# Patient Record
Sex: FEMALE
[De-identification: names, ages, dates, MRNs, and addresses within clinical notes are randomized per-mention and may not be internally consistent; named-entity substitution may affect disease eponyms.]

---

## 2022-08-09 PROBLEM — Z00.00 ENCOUNTER FOR PREVENTIVE HEALTH EXAMINATION: Status: ACTIVE | Noted: 2022-08-09

## 2022-08-11 ENCOUNTER — APPOINTMENT (OUTPATIENT)
Dept: ORTHOPEDIC SURGERY | Facility: CLINIC | Age: 52
End: 2022-08-11

## 2025-05-21 ENCOUNTER — NON-APPOINTMENT (OUTPATIENT)
Age: 55
End: 2025-05-21

## 2025-05-23 ENCOUNTER — APPOINTMENT (OUTPATIENT)
Dept: CARDIOLOGY | Facility: CLINIC | Age: 55
End: 2025-05-23

## 2025-05-23 ENCOUNTER — NON-APPOINTMENT (OUTPATIENT)
Age: 55
End: 2025-05-23

## 2025-05-23 VITALS
SYSTOLIC BLOOD PRESSURE: 144 MMHG | DIASTOLIC BLOOD PRESSURE: 80 MMHG | HEART RATE: 71 BPM | WEIGHT: 197 LBS | OXYGEN SATURATION: 96 % | BODY MASS INDEX: 35.8 KG/M2 | HEIGHT: 62.3 IN

## 2025-05-23 VITALS — DIASTOLIC BLOOD PRESSURE: 78 MMHG | SYSTOLIC BLOOD PRESSURE: 136 MMHG

## 2025-05-23 DIAGNOSIS — Z87.898 PERSONAL HISTORY OF OTHER SPECIFIED CONDITIONS: ICD-10-CM

## 2025-05-23 DIAGNOSIS — Z86.39 PERSONAL HISTORY OF OTHER ENDOCRINE, NUTRITIONAL AND METABOLIC DISEASE: ICD-10-CM

## 2025-05-23 DIAGNOSIS — Z78.9 OTHER SPECIFIED HEALTH STATUS: ICD-10-CM

## 2025-05-23 DIAGNOSIS — I10 ESSENTIAL (PRIMARY) HYPERTENSION: ICD-10-CM

## 2025-05-23 DIAGNOSIS — M54.6 PAIN IN THORACIC SPINE: ICD-10-CM

## 2025-05-23 DIAGNOSIS — Z82.49 FAMILY HISTORY OF ISCHEMIC HEART DISEASE AND OTHER DISEASES OF THE CIRCULATORY SYSTEM: ICD-10-CM

## 2025-05-23 DIAGNOSIS — E03.9 HYPOTHYROIDISM, UNSPECIFIED: ICD-10-CM

## 2025-05-23 DIAGNOSIS — R06.02 SHORTNESS OF BREATH: ICD-10-CM

## 2025-05-23 DIAGNOSIS — Z87.891 PERSONAL HISTORY OF NICOTINE DEPENDENCE: ICD-10-CM

## 2025-05-23 DIAGNOSIS — E78.5 HYPERLIPIDEMIA, UNSPECIFIED: ICD-10-CM

## 2025-05-23 PROCEDURE — 93000 ELECTROCARDIOGRAM COMPLETE: CPT

## 2025-05-23 PROCEDURE — 99203 OFFICE O/P NEW LOW 30 MIN: CPT | Mod: 25

## 2025-05-23 RX ORDER — OLMESARTAN MEDOXOMIL AND HYDROCHLOROTHIAZIDE 40; 25 MG/1; MG/1
40-25 TABLET ORAL DAILY
Refills: 0 | Status: ACTIVE | COMMUNITY

## 2025-05-23 RX ORDER — LEVOTHYROXINE SODIUM 0.09 MG/1
88 TABLET ORAL DAILY
Refills: 0 | Status: ACTIVE | COMMUNITY

## 2025-05-23 RX ORDER — AMLODIPINE BESYLATE 5 MG/1
5 TABLET ORAL DAILY
Refills: 0 | Status: ACTIVE | COMMUNITY

## 2025-05-23 RX ORDER — ASPIRIN 81 MG
81 TABLET, DELAYED RELEASE (ENTERIC COATED) ORAL DAILY
Refills: 0 | Status: ACTIVE | COMMUNITY

## 2025-05-23 RX ORDER — METOPROLOL TARTRATE 50 MG/1
50 TABLET ORAL
Qty: 2 | Refills: 1 | Status: ACTIVE | COMMUNITY
Start: 2025-05-23 | End: 1900-01-01

## 2025-05-23 RX ORDER — ATORVASTATIN CALCIUM 20 MG/1
20 TABLET, FILM COATED ORAL DAILY
Refills: 0 | Status: ACTIVE | COMMUNITY

## 2025-05-23 RX ORDER — METOPROLOL SUCCINATE 25 MG/1
25 TABLET, EXTENDED RELEASE ORAL DAILY
Refills: 0 | Status: ACTIVE | COMMUNITY

## 2025-06-10 ENCOUNTER — APPOINTMENT (OUTPATIENT)
Dept: CT IMAGING | Facility: CLINIC | Age: 55
End: 2025-06-10
Payer: COMMERCIAL

## 2025-06-10 PROCEDURE — 75574 CT ANGIO HRT W/3D IMAGE: CPT

## 2025-06-12 PROCEDURE — 75580 N-INVAS EST C FFR SW ALY CTA: CPT

## 2025-06-19 ENCOUNTER — TRANSCRIPTION ENCOUNTER (OUTPATIENT)
Age: 55
End: 2025-06-19

## 2025-06-19 ENCOUNTER — OUTPATIENT (OUTPATIENT)
Dept: OUTPATIENT SERVICES | Facility: HOSPITAL | Age: 55
LOS: 1 days | End: 2025-06-19
Payer: COMMERCIAL

## 2025-06-19 VITALS
SYSTOLIC BLOOD PRESSURE: 132 MMHG | RESPIRATION RATE: 15 BRPM | HEART RATE: 78 BPM | OXYGEN SATURATION: 98 % | DIASTOLIC BLOOD PRESSURE: 76 MMHG

## 2025-06-19 VITALS
SYSTOLIC BLOOD PRESSURE: 112 MMHG | OXYGEN SATURATION: 98 % | RESPIRATION RATE: 16 BRPM | DIASTOLIC BLOOD PRESSURE: 61 MMHG | HEART RATE: 64 BPM | TEMPERATURE: 98 F

## 2025-06-19 DIAGNOSIS — R06.02 SHORTNESS OF BREATH: ICD-10-CM

## 2025-06-19 DIAGNOSIS — I25.10 ATHEROSCLEROTIC HEART DISEASE OF NATIVE CORONARY ARTERY WITHOUT ANGINA PECTORIS: ICD-10-CM

## 2025-06-19 LAB
ALBUMIN SERPL ELPH-MCNC: 4.3 G/DL — SIGNIFICANT CHANGE UP (ref 3.3–5.2)
ALP SERPL-CCNC: 63 U/L — SIGNIFICANT CHANGE UP (ref 40–120)
ALT FLD-CCNC: 19 U/L — SIGNIFICANT CHANGE UP
ANION GAP SERPL CALC-SCNC: 13 MMOL/L — SIGNIFICANT CHANGE UP (ref 5–17)
AST SERPL-CCNC: 18 U/L — SIGNIFICANT CHANGE UP
BILIRUB SERPL-MCNC: 0.7 MG/DL — SIGNIFICANT CHANGE UP (ref 0.4–2)
BUN SERPL-MCNC: 15.7 MG/DL — SIGNIFICANT CHANGE UP (ref 8–20)
CALCIUM SERPL-MCNC: 9.3 MG/DL — SIGNIFICANT CHANGE UP (ref 8.4–10.5)
CHLORIDE SERPL-SCNC: 99 MMOL/L — SIGNIFICANT CHANGE UP (ref 96–108)
CO2 SERPL-SCNC: 27 MMOL/L — SIGNIFICANT CHANGE UP (ref 22–29)
CREAT SERPL-MCNC: 0.72 MG/DL — SIGNIFICANT CHANGE UP (ref 0.5–1.3)
EGFR: 101 ML/MIN/1.73M2 — SIGNIFICANT CHANGE UP
EGFR: 101 ML/MIN/1.73M2 — SIGNIFICANT CHANGE UP
GLUCOSE SERPL-MCNC: 94 MG/DL — SIGNIFICANT CHANGE UP (ref 70–99)
HCT VFR BLD CALC: 33.6 % — LOW (ref 34.5–45)
HGB BLD-MCNC: 11.5 G/DL — SIGNIFICANT CHANGE UP (ref 11.5–15.5)
MAGNESIUM SERPL-MCNC: 2.3 MG/DL — SIGNIFICANT CHANGE UP (ref 1.6–2.6)
MCHC RBC-ENTMCNC: 33.2 PG — SIGNIFICANT CHANGE UP (ref 27–34)
MCHC RBC-ENTMCNC: 34.2 G/DL — SIGNIFICANT CHANGE UP (ref 32–36)
MCV RBC AUTO: 97.1 FL — SIGNIFICANT CHANGE UP (ref 80–100)
NRBC # BLD AUTO: 0 K/UL — SIGNIFICANT CHANGE UP (ref 0–0)
NRBC # FLD: 0 K/UL — SIGNIFICANT CHANGE UP (ref 0–0)
NRBC BLD AUTO-RTO: 0 /100 WBCS — SIGNIFICANT CHANGE UP (ref 0–0)
PLATELET # BLD AUTO: 294 K/UL — SIGNIFICANT CHANGE UP (ref 150–400)
PMV BLD: 8.8 FL — SIGNIFICANT CHANGE UP (ref 7–13)
POTASSIUM SERPL-MCNC: 4 MMOL/L — SIGNIFICANT CHANGE UP (ref 3.5–5.3)
POTASSIUM SERPL-SCNC: 4 MMOL/L — SIGNIFICANT CHANGE UP (ref 3.5–5.3)
PROT SERPL-MCNC: 6.6 G/DL — SIGNIFICANT CHANGE UP (ref 6.6–8.7)
RBC # BLD: 3.46 M/UL — LOW (ref 3.8–5.2)
RBC # FLD: 12.2 % — SIGNIFICANT CHANGE UP (ref 10.3–14.5)
SODIUM SERPL-SCNC: 139 MMOL/L — SIGNIFICANT CHANGE UP (ref 135–145)
WBC # BLD: 4.52 K/UL — SIGNIFICANT CHANGE UP (ref 3.8–10.5)
WBC # FLD AUTO: 4.52 K/UL — SIGNIFICANT CHANGE UP (ref 3.8–10.5)

## 2025-06-19 PROCEDURE — 80053 COMPREHEN METABOLIC PANEL: CPT

## 2025-06-19 PROCEDURE — 93458 L HRT ARTERY/VENTRICLE ANGIO: CPT

## 2025-06-19 PROCEDURE — 93005 ELECTROCARDIOGRAM TRACING: CPT

## 2025-06-19 PROCEDURE — 93458 L HRT ARTERY/VENTRICLE ANGIO: CPT | Mod: 26

## 2025-06-19 PROCEDURE — C1769: CPT

## 2025-06-19 PROCEDURE — 83735 ASSAY OF MAGNESIUM: CPT

## 2025-06-19 PROCEDURE — 85027 COMPLETE CBC AUTOMATED: CPT

## 2025-06-19 PROCEDURE — C1894: CPT

## 2025-06-19 PROCEDURE — 99232 SBSQ HOSP IP/OBS MODERATE 35: CPT | Mod: 25

## 2025-06-19 PROCEDURE — 36415 COLL VENOUS BLD VENIPUNCTURE: CPT

## 2025-06-19 PROCEDURE — 93010 ELECTROCARDIOGRAM REPORT: CPT

## 2025-06-19 PROCEDURE — 99152 MOD SED SAME PHYS/QHP 5/>YRS: CPT

## 2025-06-19 PROCEDURE — C1887: CPT

## 2025-06-19 RX ORDER — ATORVASTATIN CALCIUM 80 MG/1
1 TABLET, FILM COATED ORAL
Refills: 0 | DISCHARGE

## 2025-06-19 RX ORDER — LEVOTHYROXINE SODIUM 300 MCG
1 TABLET ORAL
Refills: 0 | DISCHARGE

## 2025-06-19 RX ORDER — METOPROLOL SUCCINATE 50 MG/1
1 TABLET, EXTENDED RELEASE ORAL
Refills: 0 | DISCHARGE

## 2025-06-19 RX ORDER — ASPIRIN 325 MG
81 TABLET ORAL DAILY
Refills: 0 | Status: ACTIVE | OUTPATIENT
Start: 2025-06-19 | End: 2026-05-18

## 2025-06-19 RX ORDER — ASPIRIN 325 MG
1 TABLET ORAL
Refills: 0 | DISCHARGE

## 2025-06-19 RX ORDER — OLMESARTAN MEDOXOMIL AND HYDROCHLOROTHIAZIDE 20; 12.5 MG/1; MG/1
1 TABLET ORAL
Refills: 0 | DISCHARGE

## 2025-06-19 RX ORDER — AMLODIPINE BESYLATE 10 MG/1
1 TABLET ORAL
Refills: 0 | DISCHARGE

## 2025-06-19 RX ADMIN — Medication 250 MILLILITER(S): at 11:53

## 2025-06-19 RX ADMIN — Medication 81 MILLIGRAM(S): at 11:53

## 2025-06-19 NOTE — DISCHARGE NOTE PROVIDER - NSDCMRMEDTOKEN_GEN_ALL_CORE_FT
amLODIPine 5 mg oral tablet: 1 tab(s) orally once a day (at bedtime)  aspirin 81 mg oral capsule: 1 cap(s) orally once a day (at bedtime)  atorvastatin 20 mg oral tablet: 1 tab(s) orally once a day  Levothroid 88 mcg (0.088 mg) oral tablet: 1 tab(s) orally once a day  metoprolol succinate 25 mg oral tablet, extended release: 1 tab(s) orally once a day (at bedtime)  olmesartan-hydrochlorothiazide 40 mg-25 mg oral tablet: 1 tab(s) orally once a day

## 2025-06-19 NOTE — DISCHARGE NOTE PROVIDER - CARE PROVIDER_API CALL
Josue Bhagat  Internal Medicine  [address] [facility], [address] [address]  Phone: [phone]  Fax: [phone]  Follow Up Time: 2 weeks

## 2025-06-19 NOTE — H&P PST ADULT - HISTORY OF PRESENT ILLNESS
Narrative:     Symptoms:        Angina (Class):        Ischemic Symptoms:     Heart Failure:        Systolic/Diastolic/Combined:        NYHA Class (within 2 weeks):     Assessment of LVEF (Must be within 6 months):       EF:        Assessed by:        Date:     Prior Cardiac Interventions (LHC, stents, CABG):       PCI's (Date, Stents, Vessels):        CABG (Date, Grafts):       Stress Test (Date, Findings):     Echo (Date, Findings):     Antianginal Therapies:        Beta Blockers:         Calcium Channel Blockers:        Long Acting Nitrates:        Ranexa:     Associated Risk Factors:        Frailty Score: (N/A, mild, moderate, severe)       Cerebrovascular Disease: N/A       Chronic Lung Disease: N/A       Peripheral Arterial Disease: N/A       Chronic Kidney Disease (if yes, what is GFR): N/A       Uncontrolled Diabetes (if yes, what is HgbA1C or FBS): N/A       Poorly Controlled Hypertension (if yes, what is SBP): N/A       Morbid Obesity (if yes, what is BMI): N/A       History of Recent Ventricular Arrhythmia: N/A       Inability to Ambulate Safely: N/A       Need for Therapeutic Anticoagulation: N/A       Antiplatelet or Contrast Allergy: N/A    VITAL SIGNS:       LABS:   Narrative: 54 year old woman with PMHx of HLD, HTN, hypothyroidism and  family history of CAD with her mom having CABG age 74 and AAA. She has gained weight since the pandemic. She is active and has no chest pain but she notes significant exertional dyspnea with climbing stairs. She denies orthopnea or PND. She denies palpitations or near syncope. First  week of May, she had an episode of acute back pain that was transient and has not recurred. Pt got CCTA CALCIUM SCORE:  is 899 , The calcium score for each vessel is as follows:  Agatston Score:Left main: 70. Left anterior descendin. Left circumflex: 116. Right coronary: 292. Left main: 54. Left anterior descending: 320. Left circumflex: 93. Right coronary: 243. Pt presents today for [facility] with Dr Shaffer    Symptoms:        Angina (Class):        Ischemic Symptoms:     Heart Failure: NA       Systolic/Diastolic/Combined:        NYHA Class (within 2 weeks):     Assessment of LVEF (Must be within 6 months):       EF: 67%       Assessed by: ECHO       Date: 2024    Prior Cardiac Interventions (LHC, stents, CABG): NA       PCI's (Date, Stents, Vessels):        CABG (Date, Grafts):     Stress Test (Date, Findings): NA    Echo (Date, Findings):   Echo: 2024: LVEF 67%, trace TR      CTA heart w/coronaries  FINDINGS:  NATIVE CORONARY ARTERIES:  : There is no evidence for anomalous coronary artery origin or course. There is co-coronary artery dominance.  *  LEFT MAIN: There are calcified noncalcified atheromas in left main ostium with minimal to mild luminal narrowing. There is additional calcified atheroma in distal left main, extending to LAD ostium with mild luminal narrowing. The left main bifurcates into LAD and LCx.  *  LEFT ANTERIOR DESCENDING: There are three  diagonal branches. Distal vessel wraps around apex.  Proximal: Moderate luminal narrowing due to a long segment calcified atheroma.  Mid: Minimal luminal narrowing due to a long segment calcified atheroma.  Distal: Mild to moderate luminal narrowing due to calcified atheroma. There is myocardial bridge in distal LAD, with no significant luminal narrowing  Diagonal branches: Diagonal branches are diminutive and not well opacified. .  *  LEFT CIRCUMFLEX: There are two obtuse marginal branches.  Proximal: Mild luminal narrowing due to calcified plaque.  Mid/Distal: Normal .  First obtuse marginal: Moderate to severe luminal narrowing due to calcified atheromas.  Second obtuse marginal: Normal .  *  RIGHT CORONARY ARTERY:  Proximal: Mild luminal narrowing due to calcified and noncalcified atheromas.  Mid: Mild luminal narrowing due to calcified and noncalcified atheromas.  Distal: Minimal luminal narrowing due to calcified and noncalcified atheromas.  The posterior descending artery (PDA) and the posterior left ventricular (PLV) branches originate from the RCA.  Posterior descending: Normal .  Posterior lateral: Normal .  CALCIUM SCORE: The observed Agatston Calcium Score is 899 , which is at 99% for subjects of the same age, gender, and race/ethnicity who are free of clinical cardiovascular disease and treated diabetes. The calcium score for each vessel is as follows:  Agatston Score:  Left main: 70.  Left anterior descendin.  Left circumflex: 116.  Right coronary: 292.    Calcium volume (cubic millimeters):  Left main: 54.  Left anterior descending: 320.  Left circumflex: 93.  Right coronary: 243.    Antianginal Therapies:        Beta Blockers:  Metoprolol        Calcium Channel Blockers: Amlodipine       Long Acting Nitrates:        Ranexa:     Associated Risk Factors:        Frailty Score: (N/A, mild, moderate, severe)       Cerebrovascular Disease: N/A       Chronic Lung Disease: N/A       Peripheral Arterial Disease: N/A       Chronic Kidney Disease (if yes, what is GFR): N/A       Uncontrolled Diabetes (if yes, what is HgbA1C or FBS): N/A       Poorly Controlled Hypertension (if yes, what is SBP): N/A       Morbid Obesity (if yes, what is BMI): N/A       History of Recent Ventricular Arrhythmia: N/A       Inability to Ambulate Safely: N/A       Need for Therapeutic Anticoagulation: N/A       Antiplatelet or Contrast Allergy: N/A    VITAL SIGNS:       LABS:   Narrative: 54 year old woman with PMHx of HLD, HTN, hypothyroidism and  family history of CAD with her mom having CABG age 74 and AAA. She has gained weight since the pandemic. She is active and has no chest pain but she notes significant exertional dyspnea with climbing stairs. She denies orthopnea or PND. She denies palpitations or near syncope. First  week of May, she had an episode of acute back pain that was transient and has not recurred. Pt got CCTA CALCIUM SCORE:  is 899 , The calcium score for each vessel is as follows:  Agatston Score:Left main: 70. Left anterior descendin. Left circumflex: 116. Right coronary: 292. Left main: 54. Left anterior descending: 320. Left circumflex: 93. Right coronary: 243. Pt presents today for [facility] with Dr Shaffer    Symptoms:        Angina (Class): ll       Ischemic Symptoms: DAVIS    Heart Failure: NA       Systolic/Diastolic/Combined:        NYHA Class (within 2 weeks):     Assessment of LVEF (Must be within 6 months):       EF: 67%       Assessed by: ECHO       Date: 2024    Prior Cardiac Interventions (LHC, stents, CABG): NA       PCI's (Date, Stents, Vessels):        CABG (Date, Grafts):     Stress Test (Date, Findings): NA    Echo (Date, Findings):   Echo: 2024: LVEF 67%, trace TR      CTA heart w/coronaries  FINDINGS:  NATIVE CORONARY ARTERIES:  : There is no evidence for anomalous coronary artery origin or course. There is co-coronary artery dominance.  *  LEFT MAIN: There are calcified noncalcified atheromas in left main ostium with minimal to mild luminal narrowing. There is additional calcified atheroma in distal left main, extending to LAD ostium with mild luminal narrowing. The left main bifurcates into LAD and LCx.  *  LEFT ANTERIOR DESCENDING: There are three  diagonal branches. Distal vessel wraps around apex.  Proximal: Moderate luminal narrowing due to a long segment calcified atheroma.  Mid: Minimal luminal narrowing due to a long segment calcified atheroma.  Distal: Mild to moderate luminal narrowing due to calcified atheroma. There is myocardial bridge in distal LAD, with no significant luminal narrowing  Diagonal branches: Diagonal branches are diminutive and not well opacified. .  *  LEFT CIRCUMFLEX: There are two obtuse marginal branches.  Proximal: Mild luminal narrowing due to calcified plaque.  Mid/Distal: Normal .  First obtuse marginal: Moderate to severe luminal narrowing due to calcified atheromas.  Second obtuse marginal: Normal .  *  RIGHT CORONARY ARTERY:  Proximal: Mild luminal narrowing due to calcified and noncalcified atheromas.  Mid: Mild luminal narrowing due to calcified and noncalcified atheromas.  Distal: Minimal luminal narrowing due to calcified and noncalcified atheromas.  The posterior descending artery (PDA) and the posterior left ventricular (PLV) branches originate from the RCA.  Posterior descending: Normal .  Posterior lateral: Normal .  CALCIUM SCORE: The observed Agatston Calcium Score is 899 , which is at 99% for subjects of the same age, gender, and race/ethnicity who are free of clinical cardiovascular disease and treated diabetes. The calcium score for each vessel is as follows:  Agatston Score:  Left main: 70.  Left anterior descendin.  Left circumflex: 116.  Right coronary: 292.    Calcium volume (cubic millimeters):  Left main: 54.  Left anterior descending: 320.  Left circumflex: 93.  Right coronary: 243.    Antianginal Therapies:        Beta Blockers:  Metoprolol        Calcium Channel Blockers: Amlodipine       Long Acting Nitrates:        Ranexa:     Associated Risk Factors:        Frailty Score: (N/A, mild, moderate, severe) na       Cerebrovascular Disease: N/A       Chronic Lung Disease: N/A       Peripheral Arterial Disease: N/A       Chronic Kidney Disease (if yes, what is GFR): N/A       Uncontrolled Diabetes (if yes, what is HgbA1C or FBS): N/A       Poorly Controlled Hypertension (if yes, what is SBP): N/A       Morbid Obesity (if yes, what is BMI): 35.7       History of Recent Ventricular Arrhythmia: N/A       Inability to Ambulate Safely: N/A       Need for Therapeutic Anticoagulation: N/A       Antiplatelet or Contrast Allergy: N/A    VITAL SIGNS:   Vital Signs Last 24 Hrs  T(C): 36.5 (2025 11:19), Max: 36.5 (2025 11:19)  T(F): 97.7 (2025 11:19), Max: 97.7 (2025 11:19)  HR: 64 (2025 11:19) (64 - 64)  BP: 112/61 (2025 11:19) (112/61 - 112/61)  BP(mean): --  RR: 16 (2025 11:19) (16 - 16)  SpO2: 98% (2025 11:19) (98% - 98%)    Parameters below as of 2025 11:19  Patient On (Oxygen Delivery Method): room air    LABS:                        11.5   4.52  )-----------( 294      ( 2025 11:00 )             33.6     06-19    139  |  99  |  15.7  ----------------------------<  94  4.0   |  27.0  |  0.72    Ca    9.3      2025 11:00  Mg     2.3     06-19    TPro  6.6  /  Alb  4.3  /  TBili  0.7  /  DBili  x   /  AST  18  /  ALT  19  /  AlkPhos  63  06-

## 2025-06-19 NOTE — DISCHARGE NOTE PROVIDER - HOSPITAL COURSE
54 year old woman with PMHx of HLD, HTN, hypothyroidism and  family history of CAD with her mom having CABG age 74 and AAA. She has gained weight since the pandemic. She is active and has no chest pain but she notes significant exertional dyspnea with climbing stairs. She denies orthopnea or PND. She denies palpitations or near syncope. First  week of May, she had an episode of acute back pain that was transient and has not recurred. Pt got CCTA CALCIUM SCORE:  is 899 , The calcium score for each vessel is as follows:  Agatston Score:Left main: 70. Left anterior descendin. Left circumflex: 116. Right coronary: 292. Left main: 54. Left anterior descending: 320. Left circumflex: 93. Right coronary: 243. Pt presents today for LHC with Dr Shaffer    s/p Cath: pt is now s/p LHC/RHC via  RRA with Dr. Shaffer    Intraprocedural findings  pLAD 20%. Nonobstructive cad  Stents: none    Plan:  -Formal cath report pending  -Radial precautions discussed with patient  -Continue current medical therapy  Home Medications:  amLODIPine 5 mg oral tablet: 1 tab(s) orally once a day (at bedtime) (2025 11:18)  aspirin 81 mg oral capsule: 1 cap(s) orally once a day (at bedtime) (2025 11:12)  atorvastatin 20 mg oral tablet: 1 tab(s) orally once a day (2025 11:14)  metoprolol succinate 25 mg oral tablet, extended release: 1 tab(s) orally once a day (at bedtime) (2025 11:17)  olmesartan-hydrochlorothiazide 40 mg-25 mg oral tablet: 1 tab(s) orally once a day (2025 11:16)  -Educated regarding post procedure management and care  -Discussed the importance of RF modification  -F/U outpt in 1-2 weeks with Cardiologist Dr. Bhagat  - Plan for D/C patient this afternoon if remains HDS  and without complications    Case discussed with Dr Shaffer.

## 2025-06-19 NOTE — DISCHARGE NOTE NURSING/CASE MANAGEMENT/SOCIAL WORK - PATIENT PORTAL LINK FT
You can access the FollowMyHealth Patient Portal offered by [facility] by registering at the following website: [email or web address]. By joining [facility]’s FollowMyHealth portal, you will also be able to view your health information using other applications (apps) compatible with our system.

## 2025-06-19 NOTE — DISCHARGE NOTE NURSING/CASE MANAGEMENT/SOCIAL WORK - FINANCIAL ASSISTANCE
[facility] provides services at a reduced cost to those who are determined to be eligible through [facility]’s financial assistance program. Information regarding [facility]’s financial assistance program can be found by going to [email or web address] or by calling [phone].

## 2025-06-19 NOTE — H&P PST ADULT - ASSESSMENT
Risk Stratification:  ASA:   Mallampati:   Bleeding Risk:   Creatinine:   GFR:   Pt assessed, appropriate for sedation, pt educated regarding the plan for Versed/Fentanyl as needed.    Plan/Recommendations:   -plan for ***  -preferred access: RRA ***  -patient seen and examined  -confirmed appropriate NPO duration  -ECG and Labs reviewed  -Aspirin 81mg po pre-cath ***  -NS 250mL IV bolus pre-cath ***  -procedure discussed with patient; risks and benefits explained, questions answered  -consent obtained by attending IC    Risks, benefits, and alternatives reviewed.  Risks including but not limited to MI, death, stroke, bleeding, infection, vessel injury, hematoma, renal failure, allergic reaction, urgent open heart surgery, restenosis and stent thrombosis were reviewed.  All questions answered.  Patient is agreeable to proceed. 54 year old woman with PMHx of HLD, HTN, hypothyroidism and  family history of CAD with her mom having CABG age 74 and AAA. She has gained weight since the pandemic. She is active and has no chest pain but she notes significant exertional dyspnea with climbing stairs. She denies orthopnea or PND. She denies palpitations or near syncope. First  week of May, she had an episode of acute back pain that was transient and has not recurred. Pt got CCTA CALCIUM SCORE:  is 899 , The calcium score for each vessel is as follows:  Agatston Score:Left main: 70. Left anterior descendin. Left circumflex: 116. Right coronary: 292. Left main: 54. Left anterior descending: 320. Left circumflex: 93. Right coronary: 243. Pt presents today for C with Dr Shaffer    Risk Stratification:  ASA: 3  Mallampati: 2  Bleeding Risk:     Pt assessed, appropriate for sedation, pt educated regarding the plan for Versed/Fentanyl as needed.    Plan/Recommendations:   -plan for LHC  -preferred access: RRA vs RFA  -patient seen and examined  -confirmed appropriate NPO duration  -ECG and Labs reviewed  -Aspirin 81mg po pre-cath   -NS 250mL IV bolus pre-cath   -procedure discussed with patient; risks and benefits explained, questions answered  -consent obtained by attending IC    Risks, benefits, and alternatives reviewed.  Risks including but not limited to MI, death, stroke, bleeding, infection, vessel injury, hematoma, renal failure, allergic reaction, urgent open heart surgery, restenosis and stent thrombosis were reviewed.  All questions answered.  Patient is agreeable to proceed. 54 year old woman with PMHx of HLD, HTN, hypothyroidism and  family history of CAD with her mom having CABG age 74 and AAA. She has gained weight since the pandemic. She is active and has no chest pain but she notes significant exertional dyspnea with climbing stairs. She denies orthopnea or PND. She denies palpitations or near syncope. First  week of May, she had an episode of acute back pain that was transient and has not recurred. Pt got CCTA CALCIUM SCORE:  is 899 , The calcium score for each vessel is as follows:  Agatston Score:Left main: 70. Left anterior descendin. Left circumflex: 116. Right coronary: 292. Left main: 54. Left anterior descending: 320. Left circumflex: 93. Right coronary: 243. Pt presents today for LHC with Dr Shaffer    Risk Stratification:  ASA: 3  Mallampati: 2  Bleeding Risk: 1.6    Pt assessed, appropriate for sedation, pt educated regarding the plan for Versed/Fentanyl as needed.    Plan/Recommendations:   -plan for LHC  -preferred access: RRA vs RFA  -patient seen and examined  -confirmed appropriate NPO duration  -ECG and Labs reviewed  -Aspirin 81mg po pre-cath   -NS 250mL IV bolus pre-cath   -procedure discussed with patient; risks and benefits explained, questions answered  -consent obtained by attending IC    Risks, benefits, and alternatives reviewed.  Risks including but not limited to MI, death, stroke, bleeding, infection, vessel injury, hematoma, renal failure, allergic reaction, urgent open heart surgery, restenosis and stent thrombosis were reviewed.  All questions answered.  Patient is agreeable to proceed.

## 2025-06-19 NOTE — CHART NOTE - NSCHARTNOTEFT_GEN_A_CORE
Now s/p LHC via  RRA with Dr. Shaffer. Pt tolerated procedure well. Pt arrived to recovery in NAD and HDS.  Access site stable, no bleed/hematoma, distal pulse +.  Denies complaints of chest pain, SOB, dizziness, or palpitations    Intraprocedural findings  pLAD 20%. Nonobstructive cad  Stents: none    Medications  Versed: 0.5 mg  Fentanyl: 25 mcg  Heparin: 4000 unit  Plavix/Brillinta: none  Omnipaque: 39 ml    Closure Device: RRA band    ALLERGIES:   morphine (Rash)  Sulfur Colliod (Rash)    PHYSICAL EXAM:  Constitutional: Comfortable . No acute distress.   CNS: A&O for 3. No focal deficits.   Respiratory: CTAB, unlabored   Cardiovascular: RRR normal s1 s2. No murmur. No gallop.  Gastrointestinal: Soft, non-tender. +Bowel sounds.   Extremities: 2+ Peripheral Pulses, No  edema  Wrist: R + Benign, no hematoma, no bleeding.   Psychiatric: Calm . no agitation.   Skin: Warm and dry.    VITAL SIGNS:   T(C): 36.5 (06-19-25 @ 11:19), Max: 36.5 (06-19-25 @ 11:19)  T(F): 97.7 (06-19-25 @ 11:19), Max: 97.7 (06-19-25 @ 11:19)  HR: 77 (06-19-25 @ 14:15) (64 - 78)  BP: 119/77 (06-19-25 @ 13:40) (112/61 - 121/74)  RR: 15 (06-19-25 @ 14:15) (15 - 17)  SpO2: 98% (06-19-25 @ 14:15) (97% - 98%)    s/p Cath: pt is now s/p LHC/RHC via  RRA with Dr. Shaffer    Intraprocedural findings  pLAD 20%. Nonobstructive cad  Stents: none      Plan:  -Formal cath report pending  -Post procedure management/monitoring per protocol  -Radial compression band removal at 14.45  -Bedrest for 2 hours post procedure  -Radial precautions discussed with patient  -NS 0.9% 250ml/hr for 1 bolus: post procedure [place]IN ppx   -Repeat ECG if any clinical indication or change on tele  -Continue current medical therapy  Home Medications:  amLODIPine 5 mg oral tablet: 1 tab(s) orally once a day (at bedtime) (19 Jun 2025 11:18)  aspirin 81 mg oral capsule: 1 cap(s) orally once a day (at bedtime) (19 Jun 2025 11:12)  atorvastatin 20 mg oral tablet: 1 tab(s) orally once a day (19 Jun 2025 11:14)  metoprolol succinate 25 mg oral tablet, extended release: 1 tab(s) orally once a day (at bedtime) (19 Jun 2025 11:17)  olmesartan-hydrochlorothiazide 40 mg-25 mg oral tablet: 1 tab(s) orally once a day (19 Jun 2025 11:16)  -Educated regarding post procedure management and care  -Discussed the importance of RF modification  -F/U outpt in 1-2 weeks with Cardiologist Dr. Bhagat  - Plan for D/C patient this afternoon if remains HDS  and without complications    Case discussed with Dr Shaffer

## 2025-06-24 ENCOUNTER — NON-APPOINTMENT (OUTPATIENT)
Age: 55
End: 2025-06-24

## 2025-06-24 PROBLEM — E78.5 HYPERLIPIDEMIA, UNSPECIFIED: Chronic | Status: ACTIVE | Noted: 2025-06-19

## 2025-06-24 PROBLEM — I10 ESSENTIAL (PRIMARY) HYPERTENSION: Chronic | Status: ACTIVE | Noted: 2025-06-19

## 2025-06-24 PROBLEM — E03.9 HYPOTHYROIDISM, UNSPECIFIED: Chronic | Status: ACTIVE | Noted: 2025-06-19

## 2025-06-25 ENCOUNTER — APPOINTMENT (OUTPATIENT)
Dept: CARDIOLOGY | Facility: CLINIC | Age: 55
End: 2025-06-25
Payer: COMMERCIAL

## 2025-06-25 VITALS
SYSTOLIC BLOOD PRESSURE: 124 MMHG | HEIGHT: 62.3 IN | OXYGEN SATURATION: 98 % | DIASTOLIC BLOOD PRESSURE: 76 MMHG | HEART RATE: 70 BPM | WEIGHT: 198 LBS | BODY MASS INDEX: 35.98 KG/M2

## 2025-06-25 PROBLEM — I25.10 CAD (CORONARY ARTERY DISEASE): Status: ACTIVE | Noted: 2025-06-16

## 2025-06-25 PROBLEM — E66.9 OBESITY (BMI 30-39.9): Status: ACTIVE | Noted: 2025-06-25

## 2025-06-25 PROCEDURE — 99214 OFFICE O/P EST MOD 30 MIN: CPT | Mod: 25

## 2025-06-25 PROCEDURE — 93000 ELECTROCARDIOGRAM COMPLETE: CPT

## 2025-06-25 RX ORDER — SEMAGLUTIDE 0.25 MG/.5ML
0.25 INJECTION, SOLUTION SUBCUTANEOUS
Qty: 4 | Refills: 1 | Status: ACTIVE | COMMUNITY
Start: 2025-06-25 | End: 1900-01-01

## 2025-07-11 NOTE — H&P PST ADULT - ALCOHOL USE HISTORY SINGLE SELECT
Intervention Initiated From:  COR / YANNIU    Yandel intervened regarding:  Rounding (Video assessment)  VICU Night Rounds Checklist  24H Vital Sign Range:  Temp:  [98 °F (36.7 °C)-98.5 °F (36.9 °C)]   Pulse:  [71-88]   Resp:  [12-41]   BP: (100-152)/()   SpO2:  [93 %-100 %]     Video rounds            yes...

## 2025-07-31 ENCOUNTER — APPOINTMENT (OUTPATIENT)
Dept: SURGERY | Facility: CLINIC | Age: 55
End: 2025-07-31
Payer: COMMERCIAL

## 2025-07-31 VITALS
SYSTOLIC BLOOD PRESSURE: 126 MMHG | TEMPERATURE: 97.7 F | HEIGHT: 63.5 IN | OXYGEN SATURATION: 96 % | BODY MASS INDEX: 35.87 KG/M2 | RESPIRATION RATE: 16 BRPM | HEART RATE: 68 BPM | WEIGHT: 205 LBS | DIASTOLIC BLOOD PRESSURE: 85 MMHG

## 2025-07-31 DIAGNOSIS — K44.9 DIAPHRAGMATIC HERNIA W/OUT OBSTRUCTION OR GANGRENE: ICD-10-CM

## 2025-07-31 PROCEDURE — 99205 OFFICE O/P NEW HI 60 MIN: CPT

## 2025-08-06 ENCOUNTER — NON-APPOINTMENT (OUTPATIENT)
Age: 55
End: 2025-08-06

## 2025-08-12 ENCOUNTER — APPOINTMENT (OUTPATIENT)
Dept: CT IMAGING | Facility: CLINIC | Age: 55
End: 2025-08-12
Payer: COMMERCIAL

## 2025-08-12 ENCOUNTER — TRANSCRIPTION ENCOUNTER (OUTPATIENT)
Age: 55
End: 2025-08-12

## 2025-08-12 PROCEDURE — 71260 CT THORAX DX C+: CPT
